# Patient Record
Sex: MALE | Race: WHITE | NOT HISPANIC OR LATINO | Employment: FULL TIME | URBAN - METROPOLITAN AREA
[De-identification: names, ages, dates, MRNs, and addresses within clinical notes are randomized per-mention and may not be internally consistent; named-entity substitution may affect disease eponyms.]

---

## 2022-10-07 ENCOUNTER — HOSPITAL ENCOUNTER (EMERGENCY)
Facility: HOSPITAL | Age: 37
Discharge: HOME | End: 2022-10-07
Attending: EMERGENCY MEDICINE
Payer: COMMERCIAL

## 2022-10-07 VITALS
WEIGHT: 178.56 LBS | HEART RATE: 77 BPM | DIASTOLIC BLOOD PRESSURE: 96 MMHG | HEIGHT: 70 IN | SYSTOLIC BLOOD PRESSURE: 146 MMHG | OXYGEN SATURATION: 100 % | RESPIRATION RATE: 20 BRPM | TEMPERATURE: 97 F | BODY MASS INDEX: 25.56 KG/M2

## 2022-10-07 DIAGNOSIS — F10.920 ALCOHOLIC INTOXICATION WITHOUT COMPLICATION: Primary | ICD-10-CM

## 2022-10-07 PROCEDURE — 99281 EMR DPT VST MAYX REQ PHY/QHP: CPT
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
